# Patient Record
Sex: FEMALE | URBAN - METROPOLITAN AREA
[De-identification: names, ages, dates, MRNs, and addresses within clinical notes are randomized per-mention and may not be internally consistent; named-entity substitution may affect disease eponyms.]

---

## 2018-11-01 ENCOUNTER — TRANSFERRED RECORDS (OUTPATIENT)
Dept: HEALTH INFORMATION MANAGEMENT | Facility: CLINIC | Age: 73
End: 2018-11-01

## 2018-11-01 ENCOUNTER — MEDICAL CORRESPONDENCE (OUTPATIENT)
Dept: HEALTH INFORMATION MANAGEMENT | Facility: CLINIC | Age: 73
End: 2018-11-01

## 2018-11-05 ENCOUNTER — PRE VISIT (OUTPATIENT)
Dept: NEUROLOGY | Facility: CLINIC | Age: 73
End: 2018-11-05

## 2018-11-05 PROBLEM — M81.0 OSTEOPOROSIS: Status: ACTIVE | Noted: 2018-11-05

## 2018-11-05 PROBLEM — Z98.890 HISTORY OF SURGICAL REMOVAL OF GANGLION CYST: Status: ACTIVE | Noted: 2018-11-05
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
## 2018-11-05 PROBLEM — E03.9 HYPOTHYROIDISM: Status: ACTIVE | Noted: 2018-11-05

## 2018-11-05 RX ORDER — LEVOTHYROXINE SODIUM 137 UG/1
TABLET ORAL
COMMUNITY
Start: 2018-04-16

## 2018-11-05 NOTE — PROGRESS NOTES
Summary and Recommendations:     ESSENTIAL TREMOR  Essential Tremor - no features of parkinson   This is not a dystonic tremor at this point.     Would not recommend medications at this time.   She is not keen on taking medications at this time.   Discussed propranolol, primidone (mysoline), topiramate (Topamax) as possible medications but not needed and they have side effects  Discussed research by Dr. Tino Puente at [place] - brain bank, etc.   Most likely the head tremor   - no no tremor will increase over time     HETEROCHROMIA IRISES    PROSOPAGNOSIA ?    THYROID DISORDER - ON MEDICATIONS.    NO TESTS OR MEDICATIONS AT THIS TIME.       Harlan Velazquez MD  _____________________________________________________________________  PATIENT: Shara Churchill  72 year old female   : 1945  JESSE: 2018    Consult requested by pcp/other        Outside records reviewed and revealed  - inserted      History obtained from patient      History of Present Illness  73 yo woman with tremor    Ros  She has early cataract   Heterochromia - iris are different.  Has ocular dominance with one seeing close and one seeing far  Hearing is okay  Sleep  - had sleep study - she snores  She does not have obstruction if she sleeps on her side.   This is fine.   She has never  and no kids  She wakes up 1-2 times per night and urinates  Sleeps 5-7 hours of sleep - sometimes sleeps 8 hours  No loss of smell/taste  No gi problems  No bladder issues  Had trimming of her left  Knee  Had wiggling of her face.   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   She is living by herself and Gabrielle lives 20 miles away in [place].   Thyroid surgeries x 3 - was in her 20s and was working [employer]   Was super tired after coming home from work and fatigue prompted her to see doctor and testing was done and thyroid problem discovered.   She had something and was operated on this. - there was not a goiter  20 yrs later she had bulging of thyroid - ? Fluid inside and was biopsied and had an operation possibly at that time and medications may have been changed  3rd operation was the big surgery and remainder of her thyroid was removed  Cholesterol has been way above average  Eats plant based diet and uses olive oil  Using [facility] for medical records.   She denies diabetes.     She is here today because her head is rotating back and forth and can feel it sometimes.   She can see it in a mirror. She is unaware of it at times  She is bothered by this. It does not affect her vision.  25th of last month she noticed the symptoms and reviewed this with Katrina Lovelace of [facility].   She had blood work. No abnormal growth of her thyroid.   She had a mri of her thyroid and not her brain.   Her thyroid was okay  She was referred to me.  She has no family history of tremor  She has had some internal tremor.   She has felt shaky and has problems walking and had to grab on things. She felt that she is going to walk crooked  Duration - of symptoms  Morning and after lunch went to bed and this probably resolved.   she has noticed a bit of shaking in her hands and is somewhat wiggling  She is right handed.   She uses her left a lot as well.  Not sure if there is a difference between the two hands  She had a colón (alcohol) a week ago and not clear it helped.   Coffee does not seem to increase it.  She drinks a cup or two at breakfast and cup at lunch.  Rarely has a fourth cup.  She may have just one cup in the morning.     She is busy -   Board in a variety of roles.   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   She works with [employer] her  at [employer].   She does not drink soda and if she does it is rootbeer and does not drink coca cola.   She does not drink lemonade.     Has some problems with nouns - memory/cognitive changes with aging    She always had problems with facial recognition since she was a child.       Medications            Calcium vitamin D        Cholecalciferol vitamin D3        Levothyroxine synthroid/levothyroid 137 mcg                                                                                                                                                                                      14 Review of systems  are negative except for   Patient Active Problem List   Diagnosis     Fall     Hypothyroidism     Osteoporosis     History of surgical removal of ganglion cyst      Allergies not on file  No past surgical history on file.  No past medical history on file.  Social History     Social History     Marital status: Single     Spouse name: N/A     Number of children: N/A     Years of education: N/A     Occupational History     Not on file.     Social History Main Topics     Smoking status: Not on file     Smokeless tobacco: Not on file     Alcohol use Not on file     Drug use: Not on file     Sexual activity: Not on file     Other Topics Concern     Not on file     Social History Narrative    single. lives in [place]. Gabrielle Dickinson sister     No family history on file.  Current Outpatient Prescriptions   Medication Sig Dispense Refill     cholecalciferol (VITAMIN D3) 400 UNIT TABS tablet Daily       levothyroxine (SYNTHROID/LEVOTHROID) 137 MCG tablet Daily       Calcium-Vitamin D (CALTRATE 600 PLUS-VIT D OR) Daily        Examination  B/P: Data Unavailable, T: Data Unavailable, P: Data Unavailable, R: Data Unavailable 0 lbs 0 oz  There were no vitals taken for this visit., There is no height or weight on file to calculate BMI.    Vitals signs were added and reviewed if not above. Please refer to the chart from this visit.    General examination: well developed, nourished and normal affect  Carotid: No bruits. Chest CTA, Heart regular without gallops or murmurs. Abdomen soft nontender, no masses, bowel sounds intact. Periphery: normal pulses without edema. No skin lesions. MENTAL STATUS:  Alert, oriented x3.  Speech fluent with normal naming, repetition, comprehension.  Good right-left orientation, Can remember 2/3 objects.  5/5 on spelling world backwards.  CRANIAL NERVES:  Disks flat. Pupils are equal, round, reactive to light. HETEROCHROMIA NOTED OF IRISES Normal vascularity and fields. Extraocular movements full.  Facial sensation and movement normal.  Hearing intact. Palate moves symmetrically.  Tongue midline.  Sternocleidomastoid and trapezius strength intact.  Neck strength was normal.  NEUROLOGIC:  Tone: normal. Motor in upper and lower extremities. 5/5.  Reflexes 2/4.  Toe signs downgoing.  Good finger-nose-finger, fine finger movement, heel-shin maneuver, sensation to light touch, position sense and vibration and temperature was normal. Gait normal. Romberg and postural stability normal Tremor  - no no tremor of her head. She has mild postural and terminal tremor. No resting tremor. n o rigidity. No dystonia. No bradykinesia.       Answers for HPI/ROS submitted by the patient on 11/7/2018   General Symptoms: Yes  Skin Symptoms: No  HENT Symptoms: Yes  EYE SYMPTOMS: No  HEART SYMPTOMS: No  LUNG SYMPTOMS: No  INTESTINAL SYMPTOMS: No  URINARY SYMPTOMS: Yes  GYNECOLOGIC SYMPTOMS: No  BREAST SYMPTOMS: No  SKELETAL SYMPTOMS: No  BLOOD SYMPTOMS: No  NERVOUS SYSTEM SYMPTOMS: Yes  MENTAL HEALTH SYMPTOMS: No  Fever: No  Loss of  appetite: No  Weight loss: No  Weight gain: No  Fatigue: No  Night sweats: No  Chills: No  Increased stress: No  Excessive hunger: No  Excessive thirst: No  Feeling hot or cold when others believe the temperature is normal: No  Loss of height: No  Post-operative complications: No  Surgical site pain: No  Hallucinations: No  Change in or Loss of Energy: No  Hyperactivity: No  Confusion: No  Ear pain: No  Ear discharge: No  Hearing loss: No  Tinnitus: No  Nosebleeds: No  Congestion: No  Sinus pain: No  Trouble swallowing: No   Voice hoarseness: No  Mouth sores: No  Sore throat: No  Tooth pain: No  Gum tenderness: No  Bleeding gums: No  Change in taste: No  Change in sense of smell: No  Dry mouth: No  Hearing aid used: No  Neck lump: No  Trouble holding urine or incontinence: No  Pain or burning: No  Trouble starting or stopping: No  Increased frequency of urination: No  Blood in urine: No  Decreased frequency of urination: No  Frequent nighttime urination: No  Flank pain: No  Difficulty emptying bladder: No  Trouble with coordination: No  Dizziness or trouble with balance: No  Fainting or black-out spells: No  Memory loss: No  Headache: No  Seizures: No  Speech problems: No  Tingling: No  Tremor: Yes  Weakness: No  Difficulty walking: No  Paralysis: No  Numbness: No

## 2018-11-05 NOTE — TELEPHONE ENCOUNTER
FUTURE VISIT INFORMATION      FUTURE VISIT INFORMATION:    Date: 11/8/18    Time: 11.10a    [place] [facility]  [place] INFORMATION:    Referring provider:  Dr. Trisha Guerrero     Referring providers clinic:  [facility]    Reason for visit/diagnosis  Consult for Essential Tremors    RECORDS REQUESTED FROM:       Clinic name Comments Records Status Imaging Status   [facility] Referral, OV, US report Received  Requested                                     11/5/18: Records received, requested US image be sent over

## 2018-11-07 ENCOUNTER — PATIENT OUTREACH (OUTPATIENT)
Dept: CARE COORDINATION | Facility: CLINIC | Age: 73
End: 2018-11-07

## 2018-11-07 ASSESSMENT — ENCOUNTER SYMPTOMS
SORE THROAT: 0
LOSS OF CONSCIOUSNESS: 0
HEADACHES: 0
FLANK PAIN: 0
FEVER: 0
WEAKNESS: 0
WEIGHT LOSS: 0
SINUS PAIN: 0
MEMORY LOSS: 0
DYSURIA: 0
TASTE DISTURBANCE: 0
POLYDIPSIA: 0
TINGLING: 0
TROUBLE SWALLOWING: 0
DIFFICULTY URINATING: 0
DECREASED APPETITE: 0
SEIZURES: 0
DIZZINESS: 0
NIGHT SWEATS: 0
HOARSE VOICE: 0
SINUS CONGESTION: 0
POLYPHAGIA: 0
SMELL DISTURBANCE: 0
PARALYSIS: 0
TREMORS: 1
WEIGHT GAIN: 0
DISTURBANCES IN COORDINATION: 0
SPEECH CHANGE: 0
CHILLS: 0
NECK MASS: 0
NUMBNESS: 0
INCREASED ENERGY: 0
ALTERED TEMPERATURE REGULATION: 0
FATIGUE: 0
HALLUCINATIONS: 0
HEMATURIA: 0

## 2018-11-08 ENCOUNTER — OFFICE VISIT (OUTPATIENT)
Dept: NEUROLOGY | Facility: CLINIC | Age: 73
End: 2018-11-08
Payer: COMMERCIAL

## 2018-11-08 VITALS
TEMPERATURE: 97.4 F | OXYGEN SATURATION: 99 % | WEIGHT: 154 LBS | HEART RATE: 88 BPM | SYSTOLIC BLOOD PRESSURE: 137 MMHG | DIASTOLIC BLOOD PRESSURE: 66 MMHG | BODY MASS INDEX: 28.34 KG/M2 | RESPIRATION RATE: 16 BRPM | HEIGHT: 62 IN

## 2018-11-08 DIAGNOSIS — R25.1 TREMOR: Primary | ICD-10-CM

## 2018-11-08 DIAGNOSIS — L71.9 ROSACEA: ICD-10-CM

## 2018-11-08 DIAGNOSIS — H20.813 HETEROCHROMIA OF IRIS OF BOTH EYES: ICD-10-CM

## 2018-11-08 ASSESSMENT — PAIN SCALES - GENERAL: PAINLEVEL: NO PAIN (0)

## 2018-11-08 NOTE — MR AVS SNAPSHOT
After Visit Summary   11/8/2018    Shara Churchill    MRN: [id number]           Patient Information     Date Of Birth          1945        Visit Information        Provider Department      11/8/2018 11:10 AM Harlan Velazquez MD [facility] Neurology        Today's Diagnoses     Tremor    -  1    Heterochromia of iris of both eyes        Rosacea          Care Instructions    ESSENTIAL TREMOR  Essential Tremor - no features of parkinson   This is not a dystonic tremor at this point.     Would not recommend medications at this time.   She is not keen on taking medications at this time.   Discussed propranolol, primidone (mysoline), topiramate (Topamax) as possible medications but not needed and they have side effects  Discussed research by Dr. Tino Puente at [place] - brain bank, etc.   Most likely the head tremor   - no no tremor will increase over time     HETEROCHROMIA IRISES    PROSOPAGNOSIA ?    THYROID DISORDER - ON MEDICATIONS.    NO TESTS OR MEDICATIONS AT THIS TIME.           Follow-ups after your visit        Who to contact     Please call your clinic at [phone] to:    Ask questions about your health    Make or cancel appointments    Discuss your medicines    Learn about your test results    Speak to your doctor            Additional Information About Your Visit        U[employer] Information     [employer] gives you secure access to your electronic health record. If you see a primary care provider, you can also send messages to your care team and make appointments. If you have questions, please call your primary care clinic.  If you do not have a primary care provider, please call [phone] and they will assist you.      You[facility]r is an electronic gateway that provides easy, online access to your medical records. With [employer], you can request a clinic appointment, read your test results, renew a prescription or communicate with your care team.     To access your existing account, please  "contact your [facility] or call [phone] for assistance.        Care EveryWhere ID     This is your Care EveryWhere ID. This could be used by other organizations to access your [facility] medical records  [id number]        Your Vitals Were     Pulse Temperature Respirations Height Pulse Oximetry Breastfeeding?    88 97.4  F (36.3  C) (Oral) 16 1.58 m (5' 2.2\") 99% No    BMI (Body Mass Index)                   27.99 kg/m2            Blood Pressure from Last 3 Encounters:   11/08/18 137/66    Weight from Last 3 Encounters:   11/08/18 69.9 kg (154 lb)              Today, you had the following     No orders found for display       Primary Care Provider Office Phone # Fax #    Alvarado Silas Salazar [phone]       [facility] [address] [address]        Equal Access to Services     [facility] : Hadii aad ku hadasho Soomaali, waaxda luqadaha, qaybta kaalmada adeegyada, waxay idiin hayaan buck max . So [facility] [phone].    ATENCIÓN: Si habla español, tiene a gonzalez disposición servicios gratuitos de asistencia lingüística. Llame al [phone].    We comply with applicable federal civil rights laws and [place] laws. We do not discriminate on the basis of race, color, national origin, age, disability, sex, sexual orientation, or gender identity.            Thank you!     Thank you for choosing [facility] NEUROLOGY  for your care. Our goal is always to provide you with excellent care. Hearing back from our patients is one way we can continue to improve our services. Please take a few minutes to complete the written survey that you may receive in the mail after your visit with us. Thank you!             Your Updated Medication List - Protect others around you: Learn how to safely use, store and throw away your medicines at www.disposemymeds.org.          This list is accurate as of 11/8/18 11:57 AM.  Always use your most recent " med list.                   Brand Name Dispense Instructions for use Diagnosis    CALTRATE 600 PLUS-VIT D OR      Daily        cholecalciferol 400 UNIT Tabs tablet    vitamin D3     Daily        levothyroxine 137 MCG tablet    SYNTHROID/LEVOTHROID     Daily

## 2018-11-08 NOTE — NURSING NOTE
Chief Complaint   Patient presents with     Tremors     UMP NE MOVEMENT DISORDER- CONSULT FO ESSENTIAL TREMORS     Caleb Cummings, EMT

## 2018-11-08 NOTE — Clinical Note
11/8/2018       RE: Shara Churchill  [address] [address]     Dear Colleague,    Thank you for referring your patient, Shara Churchill, to the [facility] NEUROLOGY at [facility]. Please see a copy of my visit note below.    No notes on file    Again, thank you for allowing me to participate in the care of your patient.      Sincerely,    Harlan Velazquez MD

## 2018-11-08 NOTE — PATIENT INSTRUCTIONS
ESSENTIAL TREMOR  Essential Tremor - no features of parkinson   This is not a dystonic tremor at this point.     Would not recommend medications at this time.   She is not keen on taking medications at this time.   Discussed propranolol, primidone (mysoline), topiramate (Topamax) as possible medications but not needed and they have side effects  Discussed research by Dr. Tino Puente at [place] - brain bank, etc.   Most likely the head tremor   - no no tremor will increase over time     HETEROCHROMIA IRISES    PROSOPAGNOSIA ?    THYROID DISORDER - ON MEDICATIONS.    NO TESTS OR MEDICATIONS AT THIS TIME.

## 2018-11-08 NOTE — LETTER
2018       RE: Shara Churchill  [address] [address]     Dear Colleague,    Thank you for referring your patient, Shara Churchill, to the [facility] NEUROLOGY at [facility]. Please see a copy of my visit note below.    Summary and Recommendations:     ESSENTIAL TREMOR  Essential Tremor - no features of parkinson   This is not a dystonic tremor at this point.     Would not recommend medications at this time.   She is not keen on taking medications at this time.   Discussed propranolol, primidone (mysoline), topiramate (Topamax) as possible medications but not needed and they have side effects  Discussed research by Dr. Tino Puente at [place] - brain bank, etc.   Most likely the head tremor   - no no tremor will increase over time     HETEROCHROMIA IRISES    PROSOPAGNOSIA ?    THYROID DISORDER - ON MEDICATIONS.    NO TESTS OR MEDICATIONS AT THIS TIME.       Harlan Velazquez MD  _____________________________________________________________________  PATIENT: Shara Churchill  72 year old female   : 1945  JESSE: 2018    Consult requested by pcp/other        Outside records reviewed and revealed  - inserted      History obtained from patient      History of Present Illness  73 yo woman with tremor    Ros  She has early cataract   Heterochromia - iris are different.  Has ocular dominance with one seeing close and one seeing far  Hearing is okay  Sleep  - had sleep study - she snores  She does not have obstruction if she sleeps on her side.   This is fine.   She has never  and no kids  She wakes up 1-2 times per night and urinates  Sleeps 5-7 hours of sleep - sometimes sleeps 8 hours  No loss of smell/taste  No gi problems  No bladder issues  Had trimming of her left  Knee  Had wiggling of her face.   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   She is living by herself and Gabrielle lives 20 miles away in [place].   Thyroid surgeries x 3 - was in her 20s and was working [employer]   Was super tired after coming home from work and fatigue prompted her to see doctor and testing was done and thyroid problem discovered.   She had something and was operated on this. - there was not a goiter  20 yrs later she had bulging of thyroid - ? Fluid inside and was biopsied and had an operation possibly at that time and medications may have been changed  3rd operation was the big surgery and remainder of her thyroid was removed  Cholesterol has been way above average  Eats plant based diet and uses olive oil  Using [facility] for medical records.   She denies diabetes.     She is here today because her head is rotating back and forth and can feel it sometimes.   She can see it in a mirror. She is unaware of it at times  She is bothered by this. It does not affect her vision.  25th of last month she noticed the symptoms and reviewed this with Katrina Lovelace of [facility].   She had blood work. No abnormal growth of her thyroid.   She had a mri of her thyroid and not her brain.   Her thyroid was okay  She was referred to me.  She has no family history of tremor  She has had some internal tremor.   She has felt shaky and has problems walking and had to grab on things. She felt that she is going to walk crooked  Duration - of symptoms  Morning and after lunch went to bed and this probably resolved.   she has noticed a bit of shaking in her hands and is somewhat  wiggling  She is right handed.   She uses her left a lot as well.  Not sure if there is a difference between the two hands  She had a coóln (alcohol) a week ago and not clear it helped.   Coffee does not seem to increase it.  She drinks a cup or two at breakfast and cup at lunch. Rarely has a fourth cup.  She may have just one cup in the morning.     She is busy -   Board in a variety of roles.   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   She works with [employer] her  at [employer].   She does not drink soda and if she does it is rootbeer and does not drink coca cola.   She does not drink lemonade.     Has some problems with nouns - memory/cognitive changes with aging    She always had problems with facial recognition since she was a child.       Medications            Calcium vitamin D        Cholecalciferol vitamin D3        Levothyroxine synthroid/levothyroid 137 mcg                                                                                                                                                                                      14 Review of systems  are negative except for   Patient Active Problem List   Diagnosis     Fall     Hypothyroidism     Osteoporosis     History of surgical removal of ganglion cyst      Allergies not on file  No past surgical history on file.  No past medical history on file.  Social History     Social History     Marital status: Single     Spouse name: N/A     Number of children: N/A     Years of education: N/A     Occupational History     Not on file.     Social History Main Topics     Smoking status: Not on file     Smokeless tobacco: Not on file     Alcohol use Not on file     Drug use: Not on file     Sexual activity: Not on file     Other Topics Concern     Not on file     Social History Narrative    single. lives in [place]. Gabrielle Dickinson sister     No  family history on file.  Current Outpatient Prescriptions   Medication Sig Dispense Refill     cholecalciferol (VITAMIN D3) 400 UNIT TABS tablet Daily       levothyroxine (SYNTHROID/LEVOTHROID) 137 MCG tablet Daily       Calcium-Vitamin D (CALTRATE 600 PLUS-VIT D OR) Daily       Examination  B/P: Data Unavailable, T: Data Unavailable, P: Data Unavailable, R: Data Unavailable 0 lbs 0 oz  There were no vitals taken for this visit., There is no height or weight on file to calculate BMI.    Vitals signs were added and reviewed if not above. Please refer to the chart from this visit.    General examination: well developed, nourished and normal affect  Carotid: No bruits. Chest CTA, Heart regular without gallops or murmurs. Abdomen soft nontender, no masses, bowel sounds intact. Periphery: normal pulses without edema. No skin lesions. MENTAL STATUS:  Alert, oriented x3.  Speech fluent with normal naming, repetition, comprehension.  Good right-left orientation, Can remember 2/3 objects.  5/5 on spelling world backwards.  CRANIAL NERVES:  Disks flat. Pupils are equal, round, reactive to light. HETEROCHROMIA NOTED OF IRISES Normal vascularity and fields. Extraocular movements full.  Facial sensation and movement normal.  Hearing intact. Palate moves symmetrically.  Tongue midline.  Sternocleidomastoid and trapezius strength intact.  Neck strength was normal.  NEUROLOGIC:  Tone: normal. Motor in upper and lower extremities. 5/5.  Reflexes 2/4.  Toe signs downgoing.  Good finger-nose-finger, fine finger movement, heel-shin maneuver, sensation to light touch, position sense and vibration and temperature was normal. Gait normal. Romberg and postural stability normal Tremor  - no no tremor of her head. She has mild postural and terminal tremor. No resting tremor. n o rigidity. No dystonia. No bradykinesia.       Again, thank you for allowing me to participate in the care of your patient.      Sincerely,    Harlan Velazquez,  MD

## 2020-03-11 ENCOUNTER — HEALTH MAINTENANCE LETTER (OUTPATIENT)
Age: 75
End: 2020-03-11

## 2021-01-03 ENCOUNTER — HEALTH MAINTENANCE LETTER (OUTPATIENT)
Age: 76
End: 2021-01-03

## 2021-04-25 ENCOUNTER — HEALTH MAINTENANCE LETTER (OUTPATIENT)
Age: 76
End: 2021-04-25

## 2021-10-10 ENCOUNTER — HEALTH MAINTENANCE LETTER (OUTPATIENT)
Age: 76
End: 2021-10-10

## 2022-03-26 ENCOUNTER — HEALTH MAINTENANCE LETTER (OUTPATIENT)
Age: 77
End: 2022-03-26

## 2022-05-21 ENCOUNTER — HEALTH MAINTENANCE LETTER (OUTPATIENT)
Age: 77
End: 2022-05-21

## 2022-07-28 ENCOUNTER — NURSE TRIAGE (OUTPATIENT)
Dept: NURSING | Facility: CLINIC | Age: 77
End: 2022-07-28

## 2022-07-28 NOTE — TELEPHONE ENCOUNTER
"Triage call:     Patient is calling that she fell on the street two hours ago. Injured her right hand.   She states her hip hurts when she walks.   Unable to bend her hand completely or touch her thumb to her other fingers. Reports her fingers feel \"extremely stiff.\" Rates pain as moderate. Feeling some numbness in her fingers and states her right hand feels cold.   She is nauseous.     Per protocol, [place] [place] She verbalizes understanding and plans on going to [facility] ER.     Maria G Handley RN   07/28/22 5:16 PM  [facility] Nurse Advisor        Reason for Disposition    Numbness (loss of sensation) of finger(s)    Additional Information    Negative: Major bleeding (actively dripping or spurting) that can't be stopped    Negative: Amputation or bone sticking through the skin    Negative: Serious injury with multiple fractures (broken bones)    Negative: Sounds like a life-threatening emergency to the triager    Negative: Bullet, stabbed by knife or other serious penetrating wound    Negative: High pressure injection injury (e.g., from paint gun, usually work-related)    Negative: Looks like a broken bone or dislocated joint (crooked or deformed)    Negative: Skin is split open or gaping (length > 1/2 inch or 12 mm)    Negative: Dirt in the wound and not removed after 15 minutes of scrubbing    Negative: Bleeding won't stop after 10 minutes of direct pressure (using correct technique)    Protocols used: HAND AND WRIST INJURY-A-OH      "

## 2022-09-18 ENCOUNTER — HEALTH MAINTENANCE LETTER (OUTPATIENT)
Age: 77
End: 2022-09-18

## 2023-06-04 ENCOUNTER — HEALTH MAINTENANCE LETTER (OUTPATIENT)
Age: 78
End: 2023-06-04